# Patient Record
Sex: MALE | Race: WHITE | NOT HISPANIC OR LATINO
[De-identification: names, ages, dates, MRNs, and addresses within clinical notes are randomized per-mention and may not be internally consistent; named-entity substitution may affect disease eponyms.]

---

## 2020-05-22 ENCOUNTER — TRANSCRIPTION ENCOUNTER (OUTPATIENT)
Age: 80
End: 2020-05-22

## 2020-06-21 ENCOUNTER — TRANSCRIPTION ENCOUNTER (OUTPATIENT)
Age: 80
End: 2020-06-21

## 2020-12-11 ENCOUNTER — TRANSCRIPTION ENCOUNTER (OUTPATIENT)
Age: 80
End: 2020-12-11

## 2020-12-26 ENCOUNTER — TRANSCRIPTION ENCOUNTER (OUTPATIENT)
Age: 80
End: 2020-12-26

## 2022-06-10 ENCOUNTER — FORM ENCOUNTER (OUTPATIENT)
Age: 82
End: 2022-06-10

## 2022-06-10 ENCOUNTER — APPOINTMENT (OUTPATIENT)
Dept: MRI IMAGING | Facility: CLINIC | Age: 82
End: 2022-06-10

## 2022-06-10 ENCOUNTER — APPOINTMENT (OUTPATIENT)
Dept: ORTHOPEDIC SURGERY | Facility: CLINIC | Age: 82
End: 2022-06-10
Payer: MEDICARE

## 2022-06-10 VITALS — WEIGHT: 200 LBS | BODY MASS INDEX: 27.09 KG/M2 | HEIGHT: 72 IN

## 2022-06-10 DIAGNOSIS — I10 ESSENTIAL (PRIMARY) HYPERTENSION: ICD-10-CM

## 2022-06-10 DIAGNOSIS — M1A.0290 IDIOPATHIC CHRONIC GOUT, UNSPECIFIED ELBOW, W/OUT TOPHUS (TOPHI): ICD-10-CM

## 2022-06-10 DIAGNOSIS — M1A.0720 IDIOPATHIC CHRONIC GOUT, LEFT ANKLE AND FOOT, W/OUT TOPHUS (TOPHI): ICD-10-CM

## 2022-06-10 PROBLEM — Z00.00 ENCOUNTER FOR PREVENTIVE HEALTH EXAMINATION: Status: ACTIVE | Noted: 2022-06-10

## 2022-06-10 PROCEDURE — 99213 OFFICE O/P EST LOW 20 MIN: CPT

## 2022-06-10 PROCEDURE — 73610 X-RAY EXAM OF ANKLE: CPT | Mod: LT

## 2022-06-10 PROCEDURE — L1902: CPT

## 2022-06-10 PROCEDURE — 73630 X-RAY EXAM OF FOOT: CPT | Mod: LT

## 2022-06-10 NOTE — PHYSICAL EXAM
[Left] : left foot and ankle [] : no calf tenderness [FreeTextEntry3] : 2 + pitting edema to knee [de-identified] : plantar flexion 30 degrees [de-identified] : inversion 10 degrees [de-identified] : eversion 10 degrees [TWNoteComboBox7] : dorsiflexion 10 degrees

## 2022-06-10 NOTE — HISTORY OF PRESENT ILLNESS
[Gradual] : gradual [7] : 7 [4] : 4 [Localized] : localized [Tightness] : tightness [Rest] : rest [Standing] : standing [Walking] : walking [de-identified] : 6/10/22: Presenting with L foot/leg swelling. Swelling increases throughout the day. Having pain with walking at the ankle joint. Has been seen vascular surgeon and podiatrist, originally thought to be gout. Had doppler x3 negative [] : no [FreeTextEntry1] : left ankle  [FreeTextEntry6] : swelling  [de-identified] : nothing

## 2022-06-11 ENCOUNTER — APPOINTMENT (OUTPATIENT)
Dept: MRI IMAGING | Facility: CLINIC | Age: 82
End: 2022-06-11
Payer: MEDICARE

## 2022-06-11 PROCEDURE — 73718 MRI LOWER EXTREMITY W/O DYE: CPT | Mod: LT,MH

## 2022-06-11 PROCEDURE — 73721 MRI JNT OF LWR EXTRE W/O DYE: CPT | Mod: LT,MH

## 2022-06-22 ENCOUNTER — APPOINTMENT (OUTPATIENT)
Dept: ORTHOPEDIC SURGERY | Facility: CLINIC | Age: 82
End: 2022-06-22
Payer: MEDICARE

## 2022-06-22 VITALS — WEIGHT: 200 LBS | BODY MASS INDEX: 27.09 KG/M2 | HEIGHT: 72 IN

## 2022-06-22 DIAGNOSIS — Z78.9 OTHER SPECIFIED HEALTH STATUS: ICD-10-CM

## 2022-06-22 PROCEDURE — 99214 OFFICE O/P EST MOD 30 MIN: CPT

## 2022-06-22 NOTE — DATA REVIEWED
[MRI] : MRI [Right] : of the right [Ankle] : ankle [Report was reviewed and noted in the chart] : The report was reviewed and noted in the chart [I reviewed the films/CD and agree] : I reviewed the films/CD and agree [FreeTextEntry1] : Impression: 1. Severe marrow edema and inflammatory changes in the visualized hindfoot and midfoot centered at the  talonavicular articulation medially. Please see MRI of the left ankle to further evaluate these findings which  could indicate infection, inflammatory arthropathy or post-traumatic changes which needs to be correlated  clinically. 2. Mild arthrosis in the medial plantar first MTP and mild soft tissue swelling particularly in the dorsum of the  midfoot and forefoot but also in the plantar medial forefoot with muscle strains potentially post-traumatic or  inflammatory. 3. No Lisfranc ligament tear or metatarsal fracture. 4. No tendon discontinuity. 5. Clinical correlation and follow up is recommended. Consider CT scan of the left foot to further evaluate.

## 2022-06-22 NOTE — PHYSICAL EXAM
[Left] : left foot and ankle [Decreased] : saphenous nerve sensation decreased [] : Sensation not present to light touch in all distributions [FreeTextEntry3] : 2 + pitting edema to knee [de-identified] : plantar flexion 30 degrees [de-identified] : inversion 10 degrees [de-identified] : eversion 10 degrees [TWNoteComboBox7] : dorsiflexion 10 degrees

## 2022-06-22 NOTE — HISTORY OF PRESENT ILLNESS
[Gradual] : gradual [4] : 4 [3] : 3 [Burning] : burning [Radiating] : radiating [Shooting] : shooting [Throbbing] : throbbing [Rest] : rest [Ice] : ice [Walking] : walking [Full time] : Work status: full time [de-identified] : 6/10/22: Presenting with L foot/leg swelling. Swelling increases throughout the day. Having pain with walking at the ankle joint. Has been seen vascular surgeon and podiatrist, originally thought to be gout. Had doppler x3 negative\par 6/22/22  f/u L foot ankle [] : no [FreeTextEntry1] : left ankle  [FreeTextEntry7] : foot [de-identified] : MRI [facility] [de-identified] : nothing  [de-identified] :

## 2022-07-05 ENCOUNTER — APPOINTMENT (OUTPATIENT)
Dept: NEUROLOGY | Facility: CLINIC | Age: 82
End: 2022-07-05

## 2022-07-05 PROCEDURE — 95886 MUSC TEST DONE W/N TEST COMP: CPT

## 2022-07-05 PROCEDURE — 95911 NRV CNDJ TEST 9-10 STUDIES: CPT

## 2022-07-08 ENCOUNTER — APPOINTMENT (OUTPATIENT)
Dept: ORTHOPEDIC SURGERY | Facility: CLINIC | Age: 82
End: 2022-07-08

## 2022-07-08 VITALS — HEIGHT: 72 IN | WEIGHT: 200 LBS | BODY MASS INDEX: 27.09 KG/M2

## 2022-07-08 DIAGNOSIS — R20.0 ANESTHESIA OF SKIN: ICD-10-CM

## 2022-07-08 DIAGNOSIS — M54.17 RADICULOPATHY, LUMBOSACRAL REGION: ICD-10-CM

## 2022-07-08 DIAGNOSIS — R20.2 ANESTHESIA OF SKIN: ICD-10-CM

## 2022-07-08 PROCEDURE — 99214 OFFICE O/P EST MOD 30 MIN: CPT

## 2022-07-08 RX ORDER — MELOXICAM 15 MG/1
15 TABLET ORAL
Qty: 30 | Refills: 0 | Status: ACTIVE | COMMUNITY
Start: 2022-07-08 | End: 1900-01-01

## 2022-07-08 NOTE — DATA REVIEWED
[EMG Nerve Conduction] : A EMG Nerve Conduction test was completed of the [Positive] : positive [Consistent with peripheral neuropathy] : consistent with peripheral neuropathy [Consistent with radiculopathy] : consistent with radiculopathy [Foot] : foot [MRI] : MRI [Left] : left [Ankle] : ankle [I independently reviewed and interpreted images and report] : I independently reviewed and interpreted images and report [I reviewed the films/CD and agree] : I reviewed the films/CD and agree [FreeTextEntry1] : 6/11/22 1. Severe marrow edema and inflammatory changes in the visualized hindfoot and midfoot centered at the \par talonavicular articulation medially. Please see MRI of the left ankle to further evaluate these findings which \par could indicate infection, inflammatory arthropathy or post-traumatic changes which needs to be correlated \par clinically.\par 2. Mild arthrosis in the medial plantar first MTP and mild soft tissue swelling particularly in the dorsum of the \par midfoot and forefoot but also in the plantar medial forefoot with muscle strains potentially post-traumatic or \par inflammatory.\par 3. No Lisfranc ligament tear or metatarsal fracture.\par 4. No tendon discontinuity.\par 5. Clinical correlation and follow up is recommended. Consider CT scan of the left foot to further evaluate. [FreeTextEntry2] : 6/11/22: 1. 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 Clinical correlation regarding trauma is needed. Infection must be excluded in the absence of recent trauma \par and inflammatory arthropathy should also be considered. There is effusion, soft tissue swelling, mild deltoid \par sprain, and multifocal tendinopathy.\par 3. Consider CT scan of the left ankle to further evaluate as clinically indicated. Please see MRI of the left foot \par performed on the same date and dictated under separate report.

## 2022-07-08 NOTE — REVIEW OF SYSTEMS
[Joint Pain] : joint pain [Joint Stiffness] : joint stiffness [Joint Swelling] : joint swelling [Negative] : Heme/Lymph Include Location In Plan?: Yes Hide Additional Notes?: No Detail Level: Zone

## 2022-07-08 NOTE — HISTORY OF PRESENT ILLNESS
[6] : 6 [8] : 8 [Stabbing] : stabbing [de-identified] : 6/10/22: Presenting with L foot/leg swelling. Swelling increases throughout the day. Having pain with walking at the ankle joint. Has been seen vascular surgeon and podiatrist, originally thought to be gout. Had doppler x3 negative\par 6/22/22  f/u L foot ankle to review the MRI\par 7/8/22: f/u L foot/ankle to review EMG; WB in left ASO brace. Meloxicam prn [] : Post Surgical Visit: no [FreeTextEntry1] : L Foot/Ankle [FreeTextEntry3] : 1/2022 [FreeTextEntry5] : 63 y/o M EMG Res L foot/ankle NKI Pain ongoing since January 2022 Only prior TX is a brace  [de-identified] : Brace \par EMG

## 2022-07-08 NOTE — DISCUSSION/SUMMARY
[de-identified] : Will continue with left ASO brace.\par Will recommend PT. Gait training.\par Will start gabapentin QHS and renew Meloxicam\par fu 6 weeks

## 2022-07-08 NOTE — PHYSICAL EXAM
[Left] : left foot and ankle [Decreased] : saphenous nerve sensation decreased [4___] : inversion 4[unfilled]/5 [5___] : [facility] 5[unfilled]/5 [2+] : dorsalis pedis pulse: 2+ [] : Sensation not present to light touch in all distributions [FreeTextEntry3] : 2 + pitting edema to knee [de-identified] : plantar flexion 30 degrees [de-identified] : inversion 10 degrees [de-identified] : eversion 10 degrees [TWNoteComboBox7] : dorsiflexion 10 degrees

## 2022-08-08 ENCOUNTER — RX RENEWAL (OUTPATIENT)
Age: 82
End: 2022-08-08

## 2022-08-19 ENCOUNTER — APPOINTMENT (OUTPATIENT)
Dept: ORTHOPEDIC SURGERY | Facility: CLINIC | Age: 82
End: 2022-08-19

## 2022-08-19 DIAGNOSIS — M19.072 PRIMARY OSTEOARTHRITIS, LEFT ANKLE AND FOOT: ICD-10-CM

## 2022-08-19 DIAGNOSIS — G60.9 HEREDITARY AND IDIOPATHIC NEUROPATHY, UNSPECIFIED: ICD-10-CM

## 2022-08-19 DIAGNOSIS — I89.0 LYMPHEDEMA, NOT ELSEWHERE CLASSIFIED: ICD-10-CM

## 2022-08-19 PROCEDURE — 99213 OFFICE O/P EST LOW 20 MIN: CPT

## 2022-08-19 NOTE — PHYSICAL EXAM
[Left] : left foot and ankle [4___] : inversion 4[unfilled]/5 [5___] : [facility] 5[unfilled]/5 [2+] : dorsalis pedis pulse: 2+ [Decreased] : saphenous nerve sensation decreased [] : Sensation not present to light touch in all distributions [FreeTextEntry3] : 2 + pitting edema to knee [de-identified] : plantar flexion 30 degrees [de-identified] : inversion 10 degrees [de-identified] : eversion 10 degrees [TWNoteComboBox7] : dorsiflexion 10 degrees

## 2022-08-19 NOTE — HISTORY OF PRESENT ILLNESS
[Left Leg] : left leg [Gradual] : gradual [Sudden] : sudden [5] : 5 [4] : 4 [Burning] : burning [Shooting] : shooting [Stabbing] : stabbing [Intermittent] : intermittent [Exercising] : exercising [Retired] : Work status: retired [de-identified] : 6/10/22: Presenting with L foot/leg swelling. Swelling increases throughout the day. Having pain with walking at the ankle joint. Has been seen vascular surgeon and podiatrist, originally thought to be gout. Had doppler x3 negative\par 6/22/22  f/u L foot ankle to review the MRI\par 7/8/22: f/u L foot/ankle to review EMG; WB in left ASO brace. Meloxicam prn\par 8/19/22: f/u L foot/ankle; gabapentin and meloxicam  [] : Post Surgical Visit: no [FreeTextEntry1] : left ankle  [FreeTextEntry7] : around the ankle  [de-identified] : xrays mris  [de-identified] : None

## 2022-09-28 ENCOUNTER — FORM ENCOUNTER (OUTPATIENT)
Age: 82
End: 2022-09-28

## 2022-11-09 ENCOUNTER — RX RENEWAL (OUTPATIENT)
Age: 82
End: 2022-11-09

## 2022-11-21 ENCOUNTER — RX RENEWAL (OUTPATIENT)
Age: 82
End: 2022-11-21

## 2022-11-21 RX ORDER — MELOXICAM 15 MG/1
15 TABLET ORAL DAILY
Qty: 30 | Refills: 1 | Status: ACTIVE | COMMUNITY
Start: 2022-08-19 | End: 1900-01-01

## 2022-11-29 ENCOUNTER — FORM ENCOUNTER (OUTPATIENT)
Age: 82
End: 2022-11-29

## 2023-01-09 ENCOUNTER — RX RENEWAL (OUTPATIENT)
Age: 83
End: 2023-01-09

## 2023-02-11 ENCOUNTER — RX RENEWAL (OUTPATIENT)
Age: 83
End: 2023-02-11

## 2023-03-17 ENCOUNTER — RX RENEWAL (OUTPATIENT)
Age: 83
End: 2023-03-17

## 2023-03-17 RX ORDER — GABAPENTIN 100 MG/1
100 CAPSULE ORAL
Qty: 30 | Refills: 2 | Status: ACTIVE | COMMUNITY
Start: 2022-07-08 | End: 1900-01-01

## 2023-05-22 ENCOUNTER — FORM ENCOUNTER (OUTPATIENT)
Age: 83
End: 2023-05-22

## 2024-06-13 ENCOUNTER — OFFICE (OUTPATIENT)
Dept: URBAN - METROPOLITAN AREA CLINIC 100 | Facility: CLINIC | Age: 84
Setting detail: OPHTHALMOLOGY
End: 2024-06-13
Payer: MEDICARE

## 2024-06-13 ENCOUNTER — RX ONLY (RX ONLY)
Age: 84
End: 2024-06-13

## 2024-06-13 DIAGNOSIS — C43.9: ICD-10-CM

## 2024-06-13 DIAGNOSIS — D48.5: ICD-10-CM

## 2024-06-13 DIAGNOSIS — D49.2: ICD-10-CM

## 2024-06-13 DIAGNOSIS — H02.834: ICD-10-CM

## 2024-06-13 DIAGNOSIS — H02.831: ICD-10-CM

## 2024-06-13 PROCEDURE — 67840 REMOVE EYELID LESION: CPT | Mod: E4 | Performed by: OPHTHALMOLOGY

## 2024-06-13 PROCEDURE — 92285 EXTERNAL OCULAR PHOTOGRAPHY: CPT | Performed by: OPHTHALMOLOGY

## 2024-06-13 PROCEDURE — 99213 OFFICE O/P EST LOW 20 MIN: CPT | Mod: 25 | Performed by: OPHTHALMOLOGY

## 2024-06-13 ASSESSMENT — LID EXAM ASSESSMENTS
OD_MRD1: 4 MM
OS_BLEPHARITIS: 1+
OD_BLEPHARITIS: 1+
OS_MRD1: 4 MM

## 2024-06-13 ASSESSMENT — CONFRONTATIONAL VISUAL FIELD TEST (CVF)
OD_FINDINGS: FULL
OS_FINDINGS: FULL

## 2024-06-27 ENCOUNTER — OFFICE (OUTPATIENT)
Dept: URBAN - METROPOLITAN AREA CLINIC 100 | Facility: CLINIC | Age: 84
Setting detail: OPHTHALMOLOGY
End: 2024-06-27
Payer: MEDICARE

## 2024-06-27 DIAGNOSIS — D49.2: ICD-10-CM

## 2024-06-27 DIAGNOSIS — H01.001: ICD-10-CM

## 2024-06-27 DIAGNOSIS — D48.5: ICD-10-CM

## 2024-06-27 DIAGNOSIS — C43.9: ICD-10-CM

## 2024-06-27 DIAGNOSIS — L71.0: ICD-10-CM

## 2024-06-27 DIAGNOSIS — H02.831: ICD-10-CM

## 2024-06-27 DIAGNOSIS — H02.834: ICD-10-CM

## 2024-06-27 DIAGNOSIS — H02.34: ICD-10-CM

## 2024-06-27 DIAGNOSIS — H02.31: ICD-10-CM

## 2024-06-27 DIAGNOSIS — H01.004: ICD-10-CM

## 2024-06-27 PROBLEM — H35.3131 DRY MACULAR DEGENERATION; BOTH EYES EARLY: Status: ACTIVE | Noted: 2024-06-13

## 2024-06-27 PROCEDURE — 92012 INTRM OPH EXAM EST PATIENT: CPT | Performed by: OPHTHALMOLOGY

## 2024-06-27 ASSESSMENT — LID EXAM ASSESSMENTS
OS_MRD1: 4 MM
OS_BLEPHARITIS: 1+
OD_MRD1: 4 MM
OD_BLEPHARITIS: 1+

## 2024-06-27 ASSESSMENT — CONFRONTATIONAL VISUAL FIELD TEST (CVF)
OD_FINDINGS: FULL
OS_FINDINGS: FULL

## 2024-11-05 ENCOUNTER — RX RENEWAL (OUTPATIENT)
Age: 84
End: 2024-11-05

## 2025-02-25 ENCOUNTER — NON-APPOINTMENT (OUTPATIENT)
Age: 85
End: 2025-02-25

## 2025-04-07 ENCOUNTER — RX RENEWAL (OUTPATIENT)
Age: 85
End: 2025-04-07